# Patient Record
Sex: MALE | Race: WHITE | Employment: FULL TIME | URBAN - NONMETROPOLITAN AREA
[De-identification: names, ages, dates, MRNs, and addresses within clinical notes are randomized per-mention and may not be internally consistent; named-entity substitution may affect disease eponyms.]

---

## 2018-10-30 ENCOUNTER — HOSPITAL ENCOUNTER (EMERGENCY)
Age: 33
Discharge: HOME | End: 2018-10-30
Attending: EMERGENCY MEDICINE
Payer: COMMERCIAL

## 2018-10-30 ENCOUNTER — APPOINTMENT (OUTPATIENT)
Dept: GENERAL RADIOLOGY | Age: 33
End: 2018-10-30
Payer: COMMERCIAL

## 2018-10-30 VITALS
RESPIRATION RATE: 16 BRPM | BODY MASS INDEX: 20.99 KG/M2 | SYSTOLIC BLOOD PRESSURE: 144 MMHG | HEIGHT: 72 IN | DIASTOLIC BLOOD PRESSURE: 104 MMHG | HEART RATE: 89 BPM | WEIGHT: 155 LBS | OXYGEN SATURATION: 100 %

## 2018-10-30 DIAGNOSIS — J40 BRONCHITIS: Primary | ICD-10-CM

## 2018-10-30 DIAGNOSIS — R05.9 COUGH: ICD-10-CM

## 2018-10-30 PROCEDURE — 71046 X-RAY EXAM CHEST 2 VIEWS: CPT

## 2018-10-30 PROCEDURE — 99283 EMERGENCY DEPT VISIT LOW MDM: CPT

## 2018-10-30 RX ORDER — AZITHROMYCIN 250 MG/1
TABLET, FILM COATED ORAL
Qty: 1 PACKET | Refills: 0 | Status: SHIPPED | OUTPATIENT
Start: 2018-10-30 | End: 2018-11-09

## 2018-10-30 RX ORDER — BENZONATATE 100 MG/1
100 CAPSULE ORAL 3 TIMES DAILY PRN
Qty: 10 CAPSULE | Refills: 0 | Status: SHIPPED | OUTPATIENT
Start: 2018-10-30 | End: 2018-11-06

## 2018-10-30 NOTE — ED PROVIDER NOTES
nonpalpable  Cardiac: Heart regular rate rhythm no murmurs rubs clicks or gallops, the patient does not report any sudden onset pain no tearing pain in chest abdomin or back, there is no migratory pain nor pulse inequalities in the bilateral femoral or radial pulses. there are no concurrent neurological symptoms   Lungs:  Lungs are with mild rhonchi and wheezing to auscultation there is no use of accessory muscles no nasal flaring identified. Chest wall: There is no tenderness to palpation over the chest wall or over ribs  Abdomen:  Abdomen is soft nontender nondistended. There is no firm or pulsatile masses no rebound rigidity or guarding negative Spence's negative McBurney  Suprapubic;  there is no tenderness to palpation over the external bladder   Musculoskeletal:  5 out of 5 strength in all 4 extremities full flexion extension abduction and adduction supination pronation of all extremities and all digits. No obvious muscle atrophy is noted. No focal muscle deficits are appreciated. Posterior tibial artery is palpable pulses are intact equal bilaterally Refill less than 2 seconds lower extremity is warm and viable  Neuro: Motor intact sensory intact cranial nerves II through XII are intact level of consciousness is normal cerebellar function is normal reflexes are grossly normal.  No evidence of incontinence or loss of bowel or bladder no saddle anesthesia noted   Dermatology:  Skin is warm and dry there is no obvious abscesses lacerations or lesions noted  Lymphatic:  There is no submandibular axillary or inguinal adenopathy appreciated. Psych:  Mentation is grossly normal cognition is grossly normal.  Affect is appropriate    Diagnostics   Labs:  No results found for this visit on 10/30/18. Radiographs:  Xr Chest Standard (2 Vw)    Result Date: 10/30/2018  EXAMINATION: TWO VIEWS OF THE CHEST 10/30/2018 9:39 am COMPARISON: None.  HISTORY: ORDERING SYSTEM PROVIDED HISTORY: cough TECHNOLOGIST PROVIDED

## 2018-10-30 NOTE — LETTER
[address] [facility]  [address] [address]  Phone: [phone]               October 30, 2018    Patient: Mick Hernandez   YOB: 1985   Date of Visit: 10/30/2018       To Whom It May Concern:    Zachary Su was seen and treated in our emergency department on 10/30/2018. He may return to work on 10/31/18.       Sincerely,       Jefferson Temple RN         Signature:__________________________________